# Patient Record
Sex: FEMALE | Race: WHITE | NOT HISPANIC OR LATINO | URBAN - METROPOLITAN AREA
[De-identification: names, ages, dates, MRNs, and addresses within clinical notes are randomized per-mention and may not be internally consistent; named-entity substitution may affect disease eponyms.]

---

## 2018-11-05 ENCOUNTER — EMERGENCY (EMERGENCY)
Facility: HOSPITAL | Age: 23
LOS: 1 days | Discharge: HOME | End: 2018-11-05
Attending: EMERGENCY MEDICINE
Payer: SELF-PAY

## 2018-11-05 VITALS
HEART RATE: 86 BPM | DIASTOLIC BLOOD PRESSURE: 66 MMHG | OXYGEN SATURATION: 100 % | SYSTOLIC BLOOD PRESSURE: 101 MMHG | RESPIRATION RATE: 18 BRPM | TEMPERATURE: 98 F

## 2018-11-05 VITALS — WEIGHT: 149.91 LBS

## 2018-11-05 PROCEDURE — 96374 THER/PROPH/DIAG INJ IV PUSH: CPT

## 2018-11-05 PROCEDURE — 99284 EMERGENCY DEPT VISIT MOD MDM: CPT

## 2018-11-05 PROCEDURE — 96375 TX/PRO/DX INJ NEW DRUG ADDON: CPT

## 2018-11-05 PROCEDURE — 99284 EMERGENCY DEPT VISIT MOD MDM: CPT | Mod: 25

## 2018-11-05 RX ORDER — FAMOTIDINE 10 MG/ML
20 INJECTION INTRAVENOUS DAILY
Qty: 0 | Refills: 0 | Status: DISCONTINUED | OUTPATIENT
Start: 2018-11-05 | End: 2018-11-05

## 2018-11-05 RX ORDER — KETOROLAC TROMETHAMINE 30 MG/ML
15 SYRINGE (ML) INJECTION ONCE
Qty: 0 | Refills: 0 | Status: DISCONTINUED | OUTPATIENT
Start: 2018-11-05 | End: 2018-11-05

## 2018-11-05 RX ORDER — SODIUM CHLORIDE 9 MG/ML
1000 INJECTION INTRAMUSCULAR; INTRAVENOUS; SUBCUTANEOUS ONCE
Qty: 0 | Refills: 0 | Status: COMPLETED | OUTPATIENT
Start: 2018-11-05 | End: 2018-11-05

## 2018-11-05 RX ORDER — DEXAMETHASONE 0.5 MG/5ML
10 ELIXIR ORAL ONCE
Qty: 0 | Refills: 0 | Status: COMPLETED | OUTPATIENT
Start: 2018-11-05 | End: 2018-11-05

## 2018-11-05 RX ORDER — DEXAMETHASONE 0.5 MG/5ML
10 ELIXIR ORAL ONCE
Qty: 0 | Refills: 0 | Status: DISCONTINUED | OUTPATIENT
Start: 2018-11-05 | End: 2018-11-05

## 2018-11-05 RX ORDER — FAMOTIDINE 10 MG/ML
20 INJECTION INTRAVENOUS DAILY
Qty: 0 | Refills: 0 | Status: DISCONTINUED | OUTPATIENT
Start: 2018-11-05 | End: 2018-11-09

## 2018-11-05 RX ORDER — OXYCODONE AND ACETAMINOPHEN 5; 325 MG/1; MG/1
1 TABLET ORAL EVERY 4 HOURS
Qty: 0 | Refills: 0 | Status: DISCONTINUED | OUTPATIENT
Start: 2018-11-05 | End: 2018-11-05

## 2018-11-05 RX ADMIN — Medication 30 MILLILITER(S): at 13:10

## 2018-11-05 RX ADMIN — Medication 100 MILLIGRAM(S): at 11:54

## 2018-11-05 RX ADMIN — Medication 15 MILLIGRAM(S): at 12:16

## 2018-11-05 RX ADMIN — Medication 102 MILLIGRAM(S): at 12:24

## 2018-11-05 RX ADMIN — OXYCODONE AND ACETAMINOPHEN 1 TABLET(S): 5; 325 TABLET ORAL at 12:25

## 2018-11-05 RX ADMIN — SODIUM CHLORIDE 2000 MILLILITER(S): 9 INJECTION INTRAMUSCULAR; INTRAVENOUS; SUBCUTANEOUS at 11:54

## 2018-11-05 NOTE — ED PROVIDER NOTE - THROAT FINDINGS
TONSILLAR SWELLING/OROPHARYNGEAL EXUDATE/NO VESICLES/ULCERS/NO DROOLING/THROAT RED/uvula midline/HOARSE/MUFFLED VOICE/NO STRIDOR

## 2018-11-05 NOTE — ED ADULT NURSE NOTE - NSIMPLEMENTINTERV_GEN_ALL_ED
Implemented All Universal Safety Interventions:  [place] to call system. Call bell, personal items and telephone within reach. Instruct patient to call for assistance. Room bathroom lighting operational. Non-slip footwear when patient is off stretcher. Physically safe environment: no spills, clutter or unnecessary equipment. Stretcher in lowest position, wheels locked, appropriate side rails in place.

## 2018-11-05 NOTE — ED PROVIDER NOTE - OBJECTIVE STATEMENT
24 yo female with PMHx of tonsilitis and recurrent strep throat presents to ED with complaints if throat pain, fever and possible peritonsillar abscess (left), Sent by urgent care to [facility] peritonsillar abscess.  patient denies diff breathing, CP or dizziness. Endorses nausea, decreased PO intake, pain and fever T-max 103.2. Given Tylenol PO in UC, as well as Rx for Amox. 22 yo female with PMHx of tonsilitis and recurrent strep throat presents to ED with complaints if throat pain, fever and possible peritonsillar abscess (left), Sent by urgent care to [facility] peritonsillar abscess.  patient denies diff breathing, CP or dizziness. Endorses nausea, decreased PO intake, pain and fever T-max 103.2. Given Tylenol PO in UC, as well as Rx for Amox.    CHRISTIANO: sent from  for peritonsillar abscess

## 2018-11-05 NOTE — ED ADULT NURSE NOTE - OBJECTIVE STATEMENT
pt is a 22 y/o female accompanied by mother with c/o  throat pain went to urgent care and was referred to ED for  further evaluation pt  states she has  throat pain x 2 days . having hard time to swallow . denies any  drooling  no choking sensation  in ED but pt  with mild increase salivation. pt also states she has fever. denies any nausea and vomiting pt stable seen and evaluated by MD fernando with orders made and  carried out.

## 2018-11-05 NOTE — ED PROVIDER NOTE - PHYSICAL EXAMINATION
Lungs CTA, with equal bilateral chest rise, throat is erythematous with exudates noted left tonsillar area. Able to swallow saliva with pain, voice is muffled, no stridor noted. Lungs CTA, with equal bilateral chest rise, throat is erythematous with exudates noted left tonsillar area. Able to swallow saliva with pain, voice is muffled, no stridor noted.    CHRISTIANO: small left peritonisllar abscess. uvula midline. muffled voice, mild trismus

## 2018-11-05 NOTE — ED PROVIDER NOTE - MEDICAL DECISION MAKING DETAILS
Based on exam and history peritonsillar abscess, will hydrate, Decadron, toradol for pain, ABX and reassess.

## 2018-11-05 NOTE — ED PROVIDER NOTE - PROGRESS NOTE DETAILS
Patient developed epigastric pain after taking percocet. Will give pepcid and maalox. Has not eaten today. Now taking sips of water. improved, with medications, eating broth, will Pt is well appearing walking with steady gait, stable for discharge and follow up without fail with medical doctor. I had a detailed discussion with the patient and/or guardian regarding the historical points, exam findings, and any diagnostic results supporting the discharge diagnosis. Pt educated on care and need for follow up. Strict return instructions and red flag signs and symptoms discussed with patient. Questions answered. Pt shows understanding of discharge information and agrees to follow. Patient markedly improved. tonsils now of equal size, mildly enlarged with exudates. Trismus resolved. Normal voice. Tolerating PO. Feels markedly improved. f/u with PMD in 1-2 days. Return to the ED immediately if getting worse, not improving, or if having any new or troubling symptoms.

## 2018-11-05 NOTE — ED PROVIDER NOTE - NS ED ROS FT
Throat pain, fever, decreased PO intake Throat pain, fever, decreased PO intake    CHRISTIANO: throat pain, fever, odynophagia

## 2018-11-07 NOTE — ED POST DISCHARGE NOTE - ADDITIONAL DOCUMENTATION
Called by patient's mother regarding patient's condition and clindamycin Rx. I had spoken verbally with pharmacist on 11/5 telling him to dispense 150mg 3 tabs 3 times a day for 7 days, but patient received 300mg 1 tab 3 times a day for 7 days. I resent 150mg tabs and called pharmacy with instructions. Mother reports patient's fever has resolved, patient tolerating liquids and solids, able to speak, but still having pain. Taking tylenol. I recommended ibuprofen instead. Patient instructed she can return to ED at any time for repeat evaluation.